# Patient Record
Sex: MALE | Race: WHITE | Employment: FULL TIME | URBAN - METROPOLITAN AREA
[De-identification: names, ages, dates, MRNs, and addresses within clinical notes are randomized per-mention and may not be internally consistent; named-entity substitution may affect disease eponyms.]

---

## 2017-04-20 ENCOUNTER — OFFICE VISIT (OUTPATIENT)
Dept: FAMILY MEDICINE | Facility: CLINIC | Age: 47
End: 2017-04-20
Payer: COMMERCIAL

## 2017-04-20 ENCOUNTER — TELEPHONE (OUTPATIENT)
Dept: FAMILY MEDICINE | Facility: CLINIC | Age: 47
End: 2017-04-20

## 2017-04-20 ENCOUNTER — RADIANT APPOINTMENT (OUTPATIENT)
Dept: GENERAL RADIOLOGY | Facility: CLINIC | Age: 47
End: 2017-04-20
Attending: FAMILY MEDICINE
Payer: COMMERCIAL

## 2017-04-20 VITALS
BODY MASS INDEX: 25.11 KG/M2 | OXYGEN SATURATION: 97 % | WEIGHT: 175 LBS | TEMPERATURE: 96.9 F | DIASTOLIC BLOOD PRESSURE: 70 MMHG | SYSTOLIC BLOOD PRESSURE: 106 MMHG | HEART RATE: 65 BPM

## 2017-04-20 DIAGNOSIS — F41.9 ANXIETY: ICD-10-CM

## 2017-04-20 DIAGNOSIS — R06.02 SOB (SHORTNESS OF BREATH): Primary | ICD-10-CM

## 2017-04-20 DIAGNOSIS — R06.02 SOB (SHORTNESS OF BREATH): ICD-10-CM

## 2017-04-20 PROCEDURE — 93000 ELECTROCARDIOGRAM COMPLETE: CPT | Performed by: FAMILY MEDICINE

## 2017-04-20 PROCEDURE — 71020 XR CHEST 2 VW: CPT

## 2017-04-20 PROCEDURE — 99214 OFFICE O/P EST MOD 30 MIN: CPT | Mod: 25 | Performed by: FAMILY MEDICINE

## 2017-04-20 RX ORDER — LORAZEPAM 0.5 MG/1
0.5 TABLET ORAL
Qty: 10 TABLET | Refills: 0 | Status: SHIPPED | OUTPATIENT
Start: 2017-04-20

## 2017-04-20 NOTE — TELEPHONE ENCOUNTER
Started Sunday evening with mild sob  States that it has progressively gotten worse  Speaking in full sentances  Lungs feel tight-   denies cough/wheezing/fever/st/fatigue  Has had difficulty sleeping- not sure if this is anxiety related or not  Not asthmatic- no ill contacts    Has appointment scheduled at 4:00 pm with Dr. Noe.    Елена Albright,RN  [facility]  [phone]

## 2017-04-20 NOTE — MR AVS SNAPSHOT
"              After Visit Summary   2017    Kory Barthelemy    MRN: [id number]           Patient Information     Date Of Birth          1970        Visit Information        Provider Department      2017 4:00 PM Garfield Noe MD [facility] [place]        Today's Diagnoses     SOB (shortness of breath)    -  1    Anxiety           Follow-ups after your visit        Who to contact     If you have questions or need follow up information about today's clinic visit or your schedule please contact [facility] [place] [place] directly at [phone].  Normal or non-critical lab and imaging results will be communicated to you by Caktushart, letter or phone within 4 business days after the clinic has received the results. If you do not hear from us within 7 days, please contact the clinic through Caktushart or phone. If you have a critical or abnormal lab result, we will notify you by phone as soon as possible.  Submit refill requests through FundersClub or call your pharmacy and they will forward the refill request to us. Please allow 3 business days for your refill to be completed.          Additional Information About Your Visit        MyChart Information     FundersClub lets you send messages to your doctor, view your test results, renew your prescriptions, schedule appointments and more. To sign up, go to [email or web address] . Click on \"Log in\" on the left side of the screen, which will take you to the Welcome page. Then click on \"Sign up Now\" on the right side of the page.     You will be asked to enter the access code listed below, as well as some personal information. Please follow the directions to create your username and password.     Your access code is: [email or web address]  Expires: 2017  4:56 PM     Your access code will  in 90 days. If you need help or a new code, please call your [facility] or [phone].        Care EveryWhere ID     This is your Care EveryWhere ID. This could be " used by other organizations to access your [facility] medical records  [id number]        Your Vitals Were     Pulse Temperature Pulse Oximetry BMI (Body Mass Index)          65 96.9  F (36.1  C) (Tympanic) 97% 25.11 kg/m2         Blood Pressure from Last 3 Encounters:   04/20/17 106/70   11/19/12 102/64   10/16/12 118/72    Weight from Last 3 Encounters:   04/20/17 175 lb (79.4 kg)   11/19/12 178 lb (80.7 kg)   10/16/12 183 lb 6.4 oz (83.2 kg)              We Performed the Following     EKG 12-lead complete w/read - Clinics          Today's Medication Changes          These changes are accurate as of: 4/20/17  4:56 PM.  If you have any questions, ask your nurse or doctor.               These medicines have changed or have updated prescriptions.        Dose/Directions    * LORazepam 1 MG tablet   Commonly known as:  ATIVAN   This may have changed:  Another medication with the same name was added. Make sure you understand how and when to take each.   Used for:  Unspecified acute reaction to stress   Changed by:  Jero Laughlin MD        1 TABLET ONE HOUR BEFORE AIR TRAVEL   Quantity:  5 tablet   Refills:  0       * LORazepam 0.5 MG tablet   Commonly known as:  ATIVAN   This may have changed:  You were already taking a medication with the same name, and this prescription was added. Make sure you understand how and when to take each.   Used for:  Anxiety   Changed by:  Garfield Noe MD        Dose:  0.5 mg   Take 1 tablet (0.5 mg) by mouth nightly as needed for anxiety   Quantity:  10 tablet   Refills:  0       * Notice:  This list has 2 medication(s) that are the same as other medications prescribed for you. Read the directions carefully, and ask your doctor or other care provider to review them with you.         Where to get your medicines      Some of these will need a paper prescription and others can be bought over the counter.  Ask your nurse if you have questions.     Bring a paper prescription for each of  these medications     LORazepam 0.5 MG tablet                Primary Care Provider    None Specified       No primary provider on file.        Thank you!     Thank you for choosing [facility] [place]  for your care. Our goal is always to provide you with excellent care. Hearing back from our patients is one way we can continue to improve our services. Please take a few minutes to complete the written survey that you may receive in the mail after your visit with us. Thank you!             Your Updated Medication List - Protect others around you: Learn how to safely use, store and throw away your medicines at www.disposemymeds.org.          This list is accurate as of: 4/20/17  4:56 PM.  Always use your most recent med list.                   Brand Name Dispense Instructions for use    * LORazepam 1 MG tablet    ATIVAN    5 tablet    1 TABLET ONE HOUR BEFORE AIR TRAVEL       * LORazepam 0.5 MG tablet    ATIVAN    10 tablet    Take 1 tablet (0.5 mg) by mouth nightly as needed for anxiety       * Notice:  This list has 2 medication(s) that are the same as other medications prescribed for you. Read the directions carefully, and ask your doctor or other care provider to review them with you.

## 2017-04-20 NOTE — NURSING NOTE
"Chief Complaint   Patient presents with     Breathing Problem       Initial /70  Pulse 65  Temp 96.9  F (36.1  C) (Tympanic)  Wt 175 lb (79.4 kg)  SpO2 97%  BMI 25.11 kg/m2 Estimated body mass index is 25.11 kg/(m^2) as calculated from the following:    Height as of 11/19/12: 5' 10\" (1.778 m).    Weight as of this encounter: 175 lb (79.4 kg).  Medication Reconciliation: complete    Current Outpatient Prescriptions   Medication Sig Dispense Refill     LORazepam (ATIVAN) 1 MG tablet 1 TABLET ONE HOUR BEFORE AIR TRAVEL 5 tablet 0       Jaime M, CMA  "

## 2017-04-20 NOTE — PROGRESS NOTES
SUBJECTIVE:                                                    Kory Barthelemy is a 46 year old male who presents to clinic today for the following health issues:    Concern - difficulty taking a deep breath     Onset: 5 days ago    Feels slight panic, no known cause. No chest pain, shortness of breath, no exertional symptoms.    Feels slight anxious when he thinks about it. No upper URI symptoms, no nausea or vomiting symptoms.    He has used ativan or similar medication in th past which has relaxed him, no depression, no other psych issues reported by the patient.    Description:   Having a hard time taking a deep breath - appears to be worse at night, pt unable to sleep due to it     Intensity: moderate    Progression of Symptoms:  same    Accompanying Signs & Symptoms:  Hard to breath                 Problem list and histories reviewed & adjusted, as indicated.  Additional history: as documented    Patient Active Problem List   Diagnosis     Neck pain     Eczema     Fungal infection of nail     CARDIOVASCULAR SCREENING; LDL GOAL LESS THAN 160     Anxiety     No past surgical history on file.    Social History   Substance Use Topics     Smoking status: Never Smoker     Smokeless tobacco: Never Used     Alcohol use 2.0 oz/week     Family History   Problem Relation Age of Onset     Breast Cancer Sister      Lipids Mother      Cardiovascular Father      pacemaker         Current Outpatient Prescriptions   Medication Sig Dispense Refill     LORazepam (ATIVAN) 0.5 MG tablet Take 1 tablet (0.5 mg) by mouth nightly as needed for anxiety 10 tablet 0     LORazepam (ATIVAN) 1 MG tablet 1 TABLET ONE HOUR BEFORE AIR TRAVEL 5 tablet 0       Reviewed and updated as needed this visit by clinical staff  Tobacco  Allergies  Meds  Soc Hx      Reviewed and updated as needed this visit by Provider         ROS:  C: NEGATIVE for fever, chills, change in weight  E/M: NEGATIVE for ear, mouth and throat problems  RESP:POSITIVE for  SOB/dyspnea  CV: NEGATIVE for chest pain, palpitations or peripheral edema    OBJECTIVE:                                                    /70  Pulse 65  Temp 96.9  F (36.1  C) (Tympanic)  Wt 175 lb (79.4 kg)  SpO2 97%  BMI 25.11 kg/m2  Body mass index is 25.11 kg/(m^2).   GENERAL: healthy, alert, well nourished, well hydrated, no distress  HENT: ear canals- normal; TMs- normal; Nose- normal; Mouth- no ulcers, no lesions  NECK: no tenderness, no adenopathy, no asymmetry, no masses, no stiffness; thyroid- normal to palpation  RESP: lungs clear to auscultation - no rales, no rhonchi, no wheezes  CV: regular rates and rhythm, normal S1 S2, no S3 or S4 and no murmur, no click or rub -  Mood is anxious, hard to relax.     ASSESSMENT/PLAN:                                                        ICD-10-CM    1. SOB (shortness of breath) R06.02 XR Chest 2 Views     EKG 12-lead complete w/read - Clinics   2. Anxiety F41.9 LORazepam (ATIVAN) 0.5 MG tablet     EKG reviewed, which is normal, CXR is normal, no acute pathology, patient has alta issue with close spaces and he noticed just thinking make him nervous.  We discuss anxiety and some techniques to see if it helps, he can try as needed ativan for anxiety and panic issues.  Follow up if not getting better.      Garfield Noe MD  [facility] [place]

## 2018-01-17 ENCOUNTER — OFFICE VISIT (OUTPATIENT)
Dept: FAMILY MEDICINE | Facility: CLINIC | Age: 48
End: 2018-01-17
Payer: COMMERCIAL

## 2018-01-17 VITALS
SYSTOLIC BLOOD PRESSURE: 110 MMHG | WEIGHT: 175 LBS | DIASTOLIC BLOOD PRESSURE: 70 MMHG | OXYGEN SATURATION: 98 % | HEART RATE: 68 BPM | BODY MASS INDEX: 25.11 KG/M2 | TEMPERATURE: 97.7 F

## 2018-01-17 DIAGNOSIS — J20.9 ACUTE BRONCHITIS WITH SYMPTOMS > 10 DAYS: Primary | ICD-10-CM

## 2018-01-17 DIAGNOSIS — R05.9 COUGH: ICD-10-CM

## 2018-01-17 PROCEDURE — 99213 OFFICE O/P EST LOW 20 MIN: CPT | Performed by: FAMILY MEDICINE

## 2018-01-17 RX ORDER — AZITHROMYCIN 250 MG/1
TABLET, FILM COATED ORAL
Qty: 6 TABLET | Refills: 0 | Status: SHIPPED | OUTPATIENT
Start: 2018-01-17 | End: 2018-05-07

## 2018-01-17 RX ORDER — ALBUTEROL SULFATE 90 UG/1
2 AEROSOL, METERED RESPIRATORY (INHALATION) EVERY 6 HOURS PRN
Qty: 1 INHALER | Refills: 0 | Status: SHIPPED | OUTPATIENT
Start: 2018-01-17 | End: 2018-05-07

## 2018-01-17 NOTE — MR AVS SNAPSHOT
"              After Visit Summary   2018    Kory Barthelemy    MRN: [id number]           Patient Information     Date Of Birth          1970        Visit Information        Provider Department      2018 6:40 PM Garfield Noe MD [facility] [place]        Today's Diagnoses     Acute bronchitis with symptoms > 10 days    -  1    Cough           Follow-ups after your visit        Who to contact     If you have questions or need follow up information about today's clinic visit or your schedule please contact [facility] [place] [place] directly at [phone].  Normal or non-critical lab and imaging results will be communicated to you by Acuity Systemshart, letter or phone within 4 business days after the clinic has received the results. If you do not hear from us within 7 days, please contact the clinic through Acuity Systemshart or phone. If you have a critical or abnormal lab result, we will notify you by phone as soon as possible.  Submit refill requests through Openplay or call your pharmacy and they will forward the refill request to us. Please allow 3 business days for your refill to be completed.          Additional Information About Your Visit        MyChart Information     Openplay lets you send messages to your doctor, view your test results, renew your prescriptions, schedule appointments and more. To sign up, go to [email or web address] . Click on \"Log in\" on the left side of the screen, which will take you to the Welcome page. Then click on \"Sign up Now\" on the right side of the page.     You will be asked to enter the access code listed below, as well as some personal information. Please follow the directions to create your username and password.     Your access code is: [email or web address]  Expires: 2018  6:53 PM     Your access code will  in 90 days. If you need help or a new code, please call your [facility] or [phone].        Care EveryWhere ID     This is your Care EveryWhere ID. " This could be used by other organizations to access your [facility] medical records  [id number]        Your Vitals Were     Pulse Temperature Pulse Oximetry BMI (Body Mass Index)          68 97.7  F (36.5  C) (Tympanic) 98% 25.11 kg/m2         Blood Pressure from Last 3 Encounters:   01/17/18 110/70   04/20/17 106/70   11/19/12 102/64    Weight from Last 3 Encounters:   01/17/18 175 lb (79.4 kg)   04/20/17 175 lb (79.4 kg)   11/19/12 178 lb (80.7 kg)              Today, you had the following     No orders found for display         Today's Medication Changes          These changes are accurate as of: 1/17/18  6:53 PM.  If you have any questions, ask your nurse or doctor.               Start taking these medicines.        Dose/Directions    albuterol 108 (90 BASE) MCG/ACT Inhaler   Commonly known as:  PROAIR HFA/PROVENTIL HFA/VENTOLIN HFA   Used for:  Acute bronchitis with symptoms > 10 days, Cough   Started by:  Garfield Noe MD        Dose:  2 puff   Inhale 2 puffs into the lungs every 6 hours as needed for shortness of breath / dyspnea or wheezing   Quantity:  1 Inhaler   Refills:  0       azithromycin 250 MG tablet   Commonly known as:  ZITHROMAX   Used for:  Acute bronchitis with symptoms > 10 days, Cough   Started by:  Garfield Noe MD        Two tablets first day, then one tablet daily for four days.   Quantity:  6 tablet   Refills:  0            Where to get your medicines      [facility] [address]     Phone:  [phone]     albuterol 108 (90 BASE) MCG/ACT Inhaler    azithromycin 250 MG tablet                Primary Care Provider    None Specified       No primary provider on file.        Equal Access to Services     JIM CROWELL AH: Gisela Whitten, wawesley lukirstin, qaybta kaalmada blaze, michele montero. So [facility]  [phone].    ATENCIÓN: Si idalia cunningham, tiene a gupta disposición servicios gratuitos de asistencia lingüística. Abdelrahman betts [phone].    We comply with applicable federal civil rights laws and [place] laws. We do not discriminate on the basis of race, color, national origin, age, disability, sex, sexual orientation, or gender identity.            Thank you!     Thank you for choosing [facility] [place]  for your care. Our goal is always to provide you with excellent care. Hearing back from our patients is one way we can continue to improve our services. Please take a few minutes to complete the written survey that you may receive in the mail after your visit with us. Thank you!             Your Updated Medication List - Protect others around you: Learn how to safely use, store and throw away your medicines at www.disposemymeds.org.          This list is accurate as of: 1/17/18  6:53 PM.  Always use your most recent med list.                   Brand Name Dispense Instructions for use Diagnosis    albuterol 108 (90 BASE) MCG/ACT Inhaler    PROAIR HFA/PROVENTIL HFA/VENTOLIN HFA    1 Inhaler    Inhale 2 puffs into the lungs every 6 hours as needed for shortness of breath / dyspnea or wheezing    Acute bronchitis with symptoms > 10 days, Cough       azithromycin 250 MG tablet    ZITHROMAX    6 tablet    Two tablets first day, then one tablet daily for four days.    Acute bronchitis with symptoms > 10 days, Cough       * LORazepam 1 MG tablet    ATIVAN    5 tablet    1 TABLET ONE HOUR BEFORE AIR TRAVEL    Unspecified acute reaction to stress       * LORazepam 0.5 MG tablet    ATIVAN    10 tablet    Take 1 tablet (0.5 mg) by mouth nightly as needed for anxiety    Anxiety       * Notice:  This list has 2 medication(s) that are the same as other medications prescribed for you. Read the directions carefully, and ask your doctor or other care provider to review them with you.

## 2018-01-18 NOTE — PROGRESS NOTES
SUBJECTIVE:   Kory Barthelemy is a 47 year old male who presents to clinic today for the following health issues:      Acute Illness   Acute illness concerns: cough and chest congesiton  Onset: 8- days fever is better but now more confection and dry cough.    Fever: no    Chills/Sweats: YES    Headache (location?): no    Sinus Pressure:no    Conjunctivitis:  no    Ear Pain: no    Rhinorrhea: no    Congestion: YES    Sore Throat: no     Cough: YES    Wheeze: no    Decreased Appetite: no    Nausea: no    Vomiting: no    Diarrhea:  no    Dysuria/Freq.: no    Fatigue/Achiness: YES    Sick/Strep Exposure: no     Therapies Tried and outcome: musinex             Problem list and histories reviewed & adjusted, as indicated.          Reviewed and updated as needed this visit by clinical staffTobacco  Allergies  Meds       Reviewed and updated as needed this visit by Provider         ROS:  C: NEGATIVE for fever, chills, change in weight  E/M: NEGATIVE for ear, mouth and throat problems  RESP:POSITIVE for cough-non productive  CV: NEGATIVE for chest pain, palpitations or peripheral edema    OBJECTIVE:                                                    /70  Pulse 68  Temp 97.7  F (36.5  C) (Tympanic)  Wt 175 lb (79.4 kg)  SpO2 98%  BMI 25.11 kg/m2  Body mass index is 25.11 kg/(m^2).   GENERAL: healthy, alert, well nourished, well hydrated, no distress  HENT: ear canals- normal; TMs- normal; Nose- normal; Mouth- no ulcers, no lesions  NECK: no tenderness, no adenopathy, no asymmetry, no masses, no stiffness; thyroid- normal to palpation  RESP: lungs clear to auscultation - no rales, no rhonchi, no wheezes  CV: regular rates and rhythm, normal S1 S2, no S3 or S4 and no murmur, no click or rub -       ASSESSMENT/PLAN:                                                        ICD-10-CM    1. Acute bronchitis with symptoms > 10 days J20.9 azithromycin (ZITHROMAX) 250 MG tablet     albuterol (PROAIR HFA/PROVENTIL  HFA/VENTOLIN HFA) 108 (90 BASE) MCG/ACT Inhaler   2. Cough R05 azithromycin (ZITHROMAX) 250 MG tablet     albuterol (PROAIR HFA/PROVENTIL HFA/VENTOLIN HFA) 108 (90 BASE) MCG/ACT Inhaler       Patient has symptoms initially has flulike symptoms which have resolved but now he has lingering cough with congestion.  I suggested to use albuterol as needed for symptomatic relief.  If symptoms are not better in the next 2-3 days or any fever then he can start taking antibiotics.      Garfield Noe MD  [facility]

## 2018-01-18 NOTE — NURSING NOTE
"Chief Complaint   Patient presents with     Cough       Initial /70  Pulse 68  Temp 97.7  F (36.5  C) (Tympanic)  Wt 175 lb (79.4 kg)  SpO2 98%  BMI 25.11 kg/m2 Estimated body mass index is 25.11 kg/(m^2) as calculated from the following:    Height as of 11/19/12: 5' 10\" (1.778 m).    Weight as of this encounter: 175 lb (79.4 kg).  Medication Reconciliation: complete    Current Outpatient Prescriptions   Medication Sig Dispense Refill     LORazepam (ATIVAN) 0.5 MG tablet Take 1 tablet (0.5 mg) by mouth nightly as needed for anxiety 10 tablet 0     LORazepam (ATIVAN) 1 MG tablet 1 TABLET ONE HOUR BEFORE AIR TRAVEL 5 tablet 0       Jaime M, [facility]  "

## 2018-05-07 ENCOUNTER — OFFICE VISIT (OUTPATIENT)
Dept: FAMILY MEDICINE | Facility: CLINIC | Age: 48
End: 2018-05-07
Payer: COMMERCIAL

## 2018-05-07 VITALS
BODY MASS INDEX: 24.34 KG/M2 | HEART RATE: 61 BPM | OXYGEN SATURATION: 98 % | TEMPERATURE: 97.2 F | SYSTOLIC BLOOD PRESSURE: 112 MMHG | DIASTOLIC BLOOD PRESSURE: 65 MMHG | WEIGHT: 170 LBS | HEIGHT: 70 IN

## 2018-05-07 DIAGNOSIS — Z23 NEED FOR TDAP VACCINATION: ICD-10-CM

## 2018-05-07 DIAGNOSIS — Z00.00 ROUTINE GENERAL MEDICAL EXAMINATION AT A HEALTH CARE FACILITY: Primary | ICD-10-CM

## 2018-05-07 DIAGNOSIS — D22.9 ATYPICAL MOLE: ICD-10-CM

## 2018-05-07 DIAGNOSIS — Z23 NEED FOR VACCINATION: ICD-10-CM

## 2018-05-07 DIAGNOSIS — Z13.6 CARDIOVASCULAR SCREENING; LDL GOAL LESS THAN 160: ICD-10-CM

## 2018-05-07 LAB
CHOLEST SERPL-MCNC: 149 MG/DL
GLUCOSE SERPL-MCNC: 90 MG/DL (ref 70–99)
HDLC SERPL-MCNC: 65 MG/DL
LDLC SERPL CALC-MCNC: 78 MG/DL
NONHDLC SERPL-MCNC: 84 MG/DL
TRIGL SERPL-MCNC: 32 MG/DL

## 2018-05-07 PROCEDURE — 36415 COLL VENOUS BLD VENIPUNCTURE: CPT | Performed by: FAMILY MEDICINE

## 2018-05-07 PROCEDURE — 80061 LIPID PANEL: CPT | Performed by: FAMILY MEDICINE

## 2018-05-07 PROCEDURE — 90471 IMMUNIZATION ADMIN: CPT | Performed by: FAMILY MEDICINE

## 2018-05-07 PROCEDURE — 82947 ASSAY GLUCOSE BLOOD QUANT: CPT | Performed by: FAMILY MEDICINE

## 2018-05-07 PROCEDURE — 90715 TDAP VACCINE 7 YRS/> IM: CPT | Performed by: FAMILY MEDICINE

## 2018-05-07 PROCEDURE — 99396 PREV VISIT EST AGE 40-64: CPT | Mod: 25 | Performed by: FAMILY MEDICINE

## 2018-05-07 NOTE — MR AVS SNAPSHOT
After Visit Summary   5/7/2018    Kory Barthelemy    MRN: [id number]           Patient Information     Date Of Birth          1970        Visit Information        Provider Department      5/7/2018 9:00 AM Gaby Glynn MD [facility]        Today's Diagnoses     Routine general medical examination at a health care facility    -  1    CARDIOVASCULAR SCREENING; LDL GOAL LESS THAN 160        Need for Tdap vaccination        Atypical mole          Care Instructions      Preventive Health Recommendations  Male Ages 40 to 49    Yearly exam:             See your health care provider every year in order to  o   Review health changes.   o   Discuss preventive care.    o   Review your medicines if your doctor has prescribed any.    You should be tested each year for STDs (sexually transmitted diseases) if you re at risk.     Have a cholesterol test every 5 years.     Have a colonoscopy (test for colon cancer) if someone in your family has had colon cancer or polyps before age 50.     After age 45, have a diabetes test (fasting glucose). If you are at risk for diabetes, you should have this test every 3 years.      Talk with your health care provider about whether or not a prostate cancer screening test (PSA) is right for you.    Shots: Get a flu shot each year. Get a tetanus shot every 10 years.     Nutrition:    Eat at least 5 servings of fruits and vegetables daily.     Eat whole-grain bread, whole-wheat pasta and brown rice instead of white grains and rice.     Talk to your provider about Calcium and Vitamin D.     Lifestyle    Exercise for at least 150 minutes a week (30 minutes a day, 5 days a week). This will help you control your weight and prevent disease.     Limit alcohol to one drink per day.     No smoking.     Wear sunscreen to prevent skin cancer.     See your dentist every six months for an exam and cleaning.              Follow-ups after your visit        Additional  Services     SKIN CARE REFERRAL       Your provider has referred you to: [facility] [place]: [facility] [place] [phone]  [email or web address]     Please be aware that coverage of these services is subject to the terms and limitations of your health insurance plan.  Please check with your insurance prior to the appointment to ensure appropriate coverage for any services considered cosmetic in nature or not medically necessary.    Please bring the following with you to your appointment:    (1) Any X-Rays, CTs or MRIs which have been performed.  Contact the facility where they were done to arrange for  prior to your scheduled appointment.  Any new CT, MRI or other procedures ordered by your specialist must be performed at a [facility] facility or coordinated by your clinic's referral office.  (2) List of current medications  (3) This referral request   (4) Any documents/labs given to you for this referral                  Follow-up notes from your care team     Return for Physical Exam.      Who to contact     If you have questions or need follow up information about today's clinic visit or your schedule please contact [facility] [place] directly at [phone].  Normal or non-critical lab and imaging results will be communicated to you by MyChart, letter or phone within 4 business days after the clinic has received the results. If you do not hear from us within 7 days, please contact the clinic through MyChart or phone. If you have a critical or abnormal lab result, we will notify you by phone as soon as possible.  Submit refill requests through [employer] or call your pharmacy and they will forward the refill request to us. Please allow 3 business days for your refill to be completed.          Additional Information About Your Visit        [employer] Information     [employer] lets you send messages to your doctor, view your test results, renew your  "prescriptions, schedule appointments and more. To sign up, go to [email or web address] . Click on \"Log in\" on the left side of the screen, which will take you to the Welcome page. Then click on \"Sign up Now\" on the right side of the page.     You will be asked to enter the access code listed below, as well as some personal information. Please follow the directions to create your username and password.     Your access code is: [email or web address]  Expires: 2018  9:35 AM     Your access code will  in 90 days. If you need help or a new code, please call your [facility] clinic or [phone].        Care EveryWhere ID     This is your Care EveryWhere ID. This could be used by other organizations to access your [facility] medical records  [id number]        Your Vitals Were     Pulse Temperature Height Pulse Oximetry BMI (Body Mass Index)       61 97.2  F (36.2  C) (Tympanic) 5' 10\" (1.778 m) 98% 24.39 kg/m2        Blood Pressure from Last 3 Encounters:   18 112/65   18 110/70   17 106/70    Weight from Last 3 Encounters:   18 170 lb (77.1 kg)   18 175 lb (79.4 kg)   17 175 lb (79.4 kg)              We Performed the Following     GLUCOSE     Lipid panel reflex to direct LDL Fasting     SKIN CARE REFERRAL        Primary Care Provider Fax #    Provider Not In System [phone]                Equal Access to Services     [place] [facility] : Hadii sri ku hadasho Soomaali, waaxda luqadaha, qaybta kaalmada adeegyada, waxay philip arguello . So [facility] [phone].    ATENCIÓN: Si habla español, tiene a gupta disposición servicios gratuitos de asistencia lingüística. Llame al [phone].    We comply with applicable federal civil rights laws and [place] laws. We do not discriminate on the basis of race, color, national origin, age, disability, sex, sexual orientation, or gender identity.            Thank you!     Thank you for choosing [facility] [place]  for your " care. Our goal is always to provide you with excellent care. Hearing back from our patients is one way we can continue to improve our services. Please take a few minutes to complete the written survey that you may receive in the mail after your visit with us. Thank you!             Your Updated Medication List - Protect others around you: Learn how to safely use, store and throw away your medicines at www.disposemymeds.org.          This list is accurate as of 5/7/18  9:35 AM.  Always use your most recent med list.                   Brand Name Dispense Instructions for use Diagnosis    LORazepam 0.5 MG tablet    ATIVAN    10 tablet    Take 1 tablet (0.5 mg) by mouth nightly as needed for anxiety    Anxiety

## 2018-05-07 NOTE — PROGRESS NOTES
SUBJECTIVE:   CC: Kory Barthelemy is an 47 year old male who presents for preventative health visit.     Healthy Habits:    Do you get at least three servings of calcium containing foods daily (dairy, green leafy vegetables, etc.)? yes    Amount of exercise or daily activities, outside of work: 4-5 day(s) per week    Problems taking medications regularly not applicable    Medication side effects: No    Have you had an eye exam in the past two years? no    Do you see a dentist twice per year? no    Do you have sleep apnea, excessive snoring or daytime drowsiness?no           Today's PHQ-2 Score:   PHQ-2 ( 1999 Pfizer) 5/7/2018 4/20/2017   Q1: Little interest or pleasure in doing things 0 0   Q2: Feeling down, depressed or hopeless 0 0   PHQ-2 Score 0 0       Abuse: Current or Past(Physical, Sexual or Emotional)- No  Do you feel safe in your environment - Yes    Social History   Substance Use Topics     Smoking status: Never Smoker     Smokeless tobacco: Never Used     Alcohol use 2.0 oz/week      If you drink alcohol do you typically have >3 drinks per day or >7 drinks per week? No                      Last PSA: No results found for: PSA    Reviewed orders with patient. Reviewed health maintenance and updated orders accordingly - Yes  Patient Active Problem List   Diagnosis     Neck pain     Eczema     Fungal infection of nail     CARDIOVASCULAR SCREENING; LDL GOAL LESS THAN 160     Anxiety     Past Surgical History:   Procedure Laterality Date     NO HISTORY OF SURGERY         Social History   Substance Use Topics     Smoking status: Never Smoker     Smokeless tobacco: Never Used     Alcohol use 2.0 oz/week     Family History   Problem Relation Age of Onset     Breast Cancer Sister      Lipids Mother      Colon Cancer No family hx of            Reviewed and updated as needed this visit by clinical staff         Reviewed and updated as needed this visit by Provider        Past Medical History:   Diagnosis Date      Unspecified hearing loss     Left        ROS:  C: NEGATIVE for fever, chills, change in weight  I: NEGATIVE for worrisome rashes, moles   E: NEGATIVE for vision changes or irritation  ENT: NEGATIVE for ear, mouth and throat problems  R: NEGATIVE for significant cough or SOB  CV: NEGATIVE for chest pain, palpitations or peripheral edema  GI: NEGATIVE for nausea, abdominal pain, heartburn, or change in bowel habits   male: negative for dysuria, hematuria, decreased urinary stream, erectile dysfunction, urethral discharge  M: NEGATIVE for significant arthralgias or myalgia  N: NEGATIVE for weakness, dizziness or paresthesias  E: NEGATIVE for temperature intolerance, skin/hair changes  H: NEGATIVE for bleeding problems  P: NEGATIVE for changes in mood or affect    OBJECTIVE:   There were no vitals taken for this visit.  EXAM:  GENERAL: healthy, alert and no distress  EYES: Eyes grossly normal to inspection, PERRL and conjunctivae and sclerae normal  HENT: ear canals and TM's normal, nose and mouth without ulcers or lesions  NECK: no adenopathy, no asymmetry, masses, or scars and thyroid normal to palpation  RESP: lungs clear to auscultation - no rales, rhonchi or wheezes  CV: regular rate and rhythm, normal S1 S2, no S3 or S4, no murmur, click or rub, no peripheral edema  ABDOMEN: soft, nontender, no hepatosplenomegaly, no masses and bowel sounds normal   (male): testicles normal without atrophy or masses, no hernias and penis normal without urethral discharge  RECTAL: deferred  MS: no gross musculoskeletal defects noted, no edema  SKIN: no suspicious  Rashes has lareg 6-7 mm dark black round mole rt . lateral side of lower  torso close below belt line   NEURO: Normal strength and tone, mentation intact and speech normal  PSYCH: mentation appears normal, affect normal/bright    ASSESSMENT/PLAN:     (Z00.00) Routine general medical examination at a health care facility  (primary encounter diagnosis)  Comment:   Plan:  "GLUCOSE, Lipid panel reflex to direct LDL         Fasting            (Z13.6) CARDIOVASCULAR SCREENING; LDL GOAL LESS THAN 160  Comment:   Plan: Lipid panel reflex to direct LDL Fasting            (Z23) Need for Tdap vaccination  Comment:   Plan:     Due for Tdap vaccination , booster given, since patient  was willing to proceed. Cares discussed. Follow up as needed       (D22.9) Atypical mole  Comment: rt abdominal wall   Plan: SKIN CARE REFERRAL       Per patient this looks darker and we have changed, sister also has some precancerous lesion removed.  He wish to proceed with a referral to further evaluate      COUNSELING:  Reviewed preventive health counseling, as reflected in patient instructions       Regular exercise       Healthy diet/nutrition       Vision screening       reports that he has never smoked. He has never used smokeless tobacco.    Estimated body mass index is 25.11 kg/(m^2) as calculated from the following:    Height as of 11/19/12: 5' 10\" (1.778 m).    Weight as of 1/17/18: 175 lb (79.4 kg).   Weight management plan: Discussed healthy diet and exercise guidelines and patient will follow up in 12 months in clinic to re-evaluate.    Counseling Resources:  ATP IV Guidelines  Pooled Cohorts Equation Calculator  FRAX Risk Assessment  ICSI Preventive Guidelines  Dietary Guidelines for Americans, 2010  USDA's MyPlate  ASA Prophylaxis  Lung CA Screening    Gaby Glynn MD  [place]  "

## 2018-05-08 ENCOUNTER — TELEPHONE (OUTPATIENT)
Dept: FAMILY MEDICINE | Facility: CLINIC | Age: 48
End: 2018-05-08

## 2018-05-08 NOTE — TELEPHONE ENCOUNTER
Patient referred to [facility] by Gaby Aruaz MD . Called patient, left message to call back if pt would like to schedule appointment with [facility].       Monica Lau MA on 5/8/2018 at 9:04 AM

## 2019-08-12 ENCOUNTER — OFFICE VISIT (OUTPATIENT)
Dept: FAMILY MEDICINE | Facility: CLINIC | Age: 49
End: 2019-08-12
Payer: COMMERCIAL

## 2019-08-12 VITALS
TEMPERATURE: 97 F | BODY MASS INDEX: 23.91 KG/M2 | RESPIRATION RATE: 14 BRPM | HEART RATE: 64 BPM | OXYGEN SATURATION: 99 % | DIASTOLIC BLOOD PRESSURE: 88 MMHG | HEIGHT: 70 IN | WEIGHT: 167 LBS | SYSTOLIC BLOOD PRESSURE: 124 MMHG

## 2019-08-12 DIAGNOSIS — H90.3 ASYMMETRICAL SENSORINEURAL HEARING LOSS: Primary | ICD-10-CM

## 2019-08-12 PROCEDURE — 99213 OFFICE O/P EST LOW 20 MIN: CPT | Performed by: PHYSICIAN ASSISTANT

## 2019-08-12 ASSESSMENT — ANXIETY QUESTIONNAIRES
2. NOT BEING ABLE TO STOP OR CONTROL WORRYING: NOT AT ALL
1. FEELING NERVOUS, ANXIOUS, OR ON EDGE: NOT AT ALL
7. FEELING AFRAID AS IF SOMETHING AWFUL MIGHT HAPPEN: NOT AT ALL
GAD7 TOTAL SCORE: 0
3. WORRYING TOO MUCH ABOUT DIFFERENT THINGS: NOT AT ALL
5. BEING SO RESTLESS THAT IT IS HARD TO SIT STILL: NOT AT ALL
6. BECOMING EASILY ANNOYED OR IRRITABLE: NOT AT ALL

## 2019-08-12 ASSESSMENT — PATIENT HEALTH QUESTIONNAIRE - PHQ9
5. POOR APPETITE OR OVEREATING: NOT AT ALL
SUM OF ALL RESPONSES TO PHQ QUESTIONS 1-9: 0

## 2019-08-12 ASSESSMENT — MIFFLIN-ST. JEOR: SCORE: 1643.76

## 2019-08-12 NOTE — PROGRESS NOTES
Subjective     Kory Barthelemy is a 48 year old male who presents to clinic today for the following health issues:    HPI   Trouble hearing out of left ear      Duration: 2 weeks ago    Description (location/character/radiation): feels as though equilibrium is off/feeling dizzy. No pain    Intensity:  severe    Accompanying signs and symptoms:     History (similar episodes/previous evaluation): None    Precipitating or alleviating factors: None    Therapies tried and outcome: None       Marshal has been experiencing 2 week hx of progressive hearing loss in his left ear.  He feels like the could in his left ear is muffled.  He is not experiencing a plugging sensation or any pain.  No tinnitus.  He has noticed some intermittent dizziness that feels like spinning which occurs at different times, but not specifically with movement.  No recent diving or travel.  No fevers or URI symptoms        Patient Active Problem List   Diagnosis     Neck pain     Eczema     Fungal infection of nail     CARDIOVASCULAR SCREENING; LDL GOAL LESS THAN 160     Anxiety     Past Surgical History:   Procedure Laterality Date     NO HISTORY OF SURGERY         Social History     Tobacco Use     Smoking status: Never Smoker     Smokeless tobacco: Never Used   Substance Use Topics     Alcohol use: Yes     Alcohol/week: 2.0 oz     Family History   Problem Relation Age of Onset     Breast Cancer Sister      Lipids Mother      Colon Cancer No family hx of          Current Outpatient Medications   Medication Sig Dispense Refill     LORazepam (ATIVAN) 0.5 MG tablet Take 1 tablet (0.5 mg) by mouth nightly as needed for anxiety 10 tablet 0     Allergies   Allergen Reactions     No Known Drug Allergies               Review of Systems   ROS COMP: Constitutional, HEENT, cardiovascular, pulmonary, gi and gu systems are negative, except as otherwise noted.      Objective    /88   Pulse 64   Temp 97  F (36.1  C) (Tympanic)   Resp 14   Ht 1.778 m (5'  "10\")   Wt 75.8 kg (167 lb)   SpO2 99%   BMI 23.96 kg/m    Body mass index is 23.96 kg/m .  Physical Exam   GENERAL: healthy, alert and no distress  HENT: ear canals and TM's normal, nose and mouth without ulcers or lesions  Neuro: FROM of UE bilaterally, left ear hearing absent to finger rubs, Jain test lateralizes to right ear.  Rinne test shows AC=BC, negative Priscila Hallpike  Diagnostic Test Results:  Labs reviewed in Epic        Assessment & Plan     1. Asymmetrical sensorineural hearing loss  Ears appear normal today without evidence of infection, perforation or fluid or cerumen impaction.   Unclear cause of hearing loss of left ear.  I have advised that he see ENT in the next few days for evaluation.  This appointment was scheduled for 8/14/2019.    - OTOLARYNGOLOGY REFERRAL        Follow up as needed    No follow-ups on file.    Scott Gaytan PA-C  [facility]      "

## 2019-08-12 NOTE — PATIENT INSTRUCTIONS
ENT Referral - [facility]  Appt on Wednesday, August 14 at 9:00 check in at 8:45 with Dr Moe  [address]  [place], [place]  [phone]   [email or web address]

## 2019-08-13 ASSESSMENT — ANXIETY QUESTIONNAIRES: GAD7 TOTAL SCORE: 0

## 2019-08-14 ENCOUNTER — TRANSFERRED RECORDS (OUTPATIENT)
Dept: HEALTH INFORMATION MANAGEMENT | Facility: CLINIC | Age: 49
End: 2019-08-14

## 2019-11-12 ENCOUNTER — OFFICE VISIT (OUTPATIENT)
Dept: OTOLARYNGOLOGY | Facility: CLINIC | Age: 49
End: 2019-11-12
Payer: COMMERCIAL

## 2019-11-12 VITALS — HEART RATE: 69 BPM | DIASTOLIC BLOOD PRESSURE: 77 MMHG | OXYGEN SATURATION: 99 % | SYSTOLIC BLOOD PRESSURE: 111 MMHG

## 2019-11-12 DIAGNOSIS — H90.3 SNHL (SENSORY-NEURAL HEARING LOSS), ASYMMETRICAL: Primary | ICD-10-CM

## 2019-11-12 PROCEDURE — 99202 OFFICE O/P NEW SF 15 MIN: CPT | Performed by: OTOLARYNGOLOGY

## 2019-11-12 ASSESSMENT — ENCOUNTER SYMPTOMS
BLURRED VISION: 0
HEMOPTYSIS: 0
NAUSEA: 0
DIZZINESS: 0
PHOTOPHOBIA: 0
STRIDOR: 0
HEARTBURN: 0
VOMITING: 0
SINUS PAIN: 0
CONSTITUTIONAL NEGATIVE: 1
DOUBLE VISION: 0
TINGLING: 0
COUGH: 0
TREMORS: 0
HEADACHES: 0
SPUTUM PRODUCTION: 0
BRUISES/BLEEDS EASILY: 0
SORE THROAT: 0

## 2019-11-12 NOTE — LETTER
11/12/2019         RE: Kory Barthelemy  [address]  [place] [address]        Dear Colleague,    Thank you for referring your patient, Kory Barthelemy, to the [facility]. Please see a copy of my visit note below.    HPI    This is a 49 year old patient who has been having hearing impairment in his left ear for years. Recently noticed worsening in his left ear hearing. Tinnitus is constant; mid range frequencies. Describes some motion related off-balance but no real spinning or vertigo. No autoimmune diseases, hypertension or diabetes.    Hearing test from ENT specialty care: There is asymmetry. Left hearing is worse. Bilateral sloping sensorineural hearing impairment. Tymps: WNLs. Recognition: 64 in the left; 100% in the right. Fistula test: negative. Huyen: Negative.  MRI of brain: within normal limits.    Review of Systems   Constitutional: Negative.    HENT: Positive for congestion, hearing loss and tinnitus. Negative for ear discharge, ear pain, nosebleeds, sinus pain and sore throat.    Eyes: Negative for blurred vision, double vision and photophobia.   Respiratory: Negative for cough, hemoptysis, sputum production and stridor.    Gastrointestinal: Negative for heartburn, nausea and vomiting.   Skin: Negative.    Neurological: Negative for dizziness, tingling, tremors and headaches.   Endo/Heme/Allergies: Negative for environmental allergies. Does not bruise/bleed easily.         Physical Exam  Vitals signs reviewed.   Constitutional:       Appearance: Normal appearance.   HENT:      Head: Normocephalic and atraumatic.      Jaw: There is normal jaw occlusion.      Right Ear: Tympanic membrane, ear canal and external ear normal. No decreased hearing noted. No laceration, drainage, swelling or tenderness.      Left Ear: Tympanic membrane, ear canal and external ear normal. Decreased hearing noted. No laceration, drainage, swelling or tenderness.      Nose: Septal deviation,  mucosal edema and congestion present.      Right Turbinates: Enlarged and swollen.      Left Turbinates: Enlarged and swollen.      Mouth/Throat:      Mouth: Mucous membranes are moist.      Pharynx: Oropharynx is clear. Uvula midline.   Eyes:      Extraocular Movements: Extraocular movements intact.      Pupils: Pupils are equal, round, and reactive to light.   Neurological:      Mental Status: He is alert.     Septum is deviated to the right; columellar dislocation to the left; inferior turbinates are enlarged.    A/P  This pleasant patient is having asymmetrical sensorineural hearing loss for months ( the first test was in 2005; likely for years). MRI: normal limits. There is asymmetry in his hearing. Left hearing is worse. Bilateral sloping sensorineural hearing impairment. Tymps: WNLs. Recognition: 64 in the left; 100% in the right. Fistula test: negative. Huyen: Negative. Options were discussed including topical Dexamethasone 24 mg. His questions were answered.        Again, thank you for allowing me to participate in the care of your patient.        Sincerely,        Geronimo Pedro MD

## 2019-11-12 NOTE — NURSING NOTE
Kory Barthelemy's goals for this visit include:   Chief Complaint   Patient presents with     Consult     2nd opinion, hearing loss, tinnitus in left ear periodically at night     He requests these members of his care team be copied on today's visit information: N/A    PCP: [facility] [place] [place]    Referring Provider:  No referring provider defined for this encounter.    /77 (BP Location: Right arm, Patient Position: Sitting, Cuff Size: Adult Large)   Pulse 69   SpO2 99%     Do you need any medication refills at today's visit? No    Donna Whittington LPN

## 2019-11-12 NOTE — PROGRESS NOTES
HPI    This is a 49 year old patient who has been having hearing impairment in his left ear for years. Recently noticed worsening in his left ear hearing. Tinnitus is constant; mid range frequencies. Describes some motion related off-balance but no real spinning or vertigo. No autoimmune diseases, hypertension or diabetes.    Hearing test from ENT specialty care: There is asymmetry. Left hearing is worse. Bilateral sloping sensorineural hearing impairment. Tymps: WNLs. Recognition: 64 in the left; 100% in the right. Fistula test: negative. Huyen: Negative.  MRI of brain: within normal limits.    Review of Systems   Constitutional: Negative.    HENT: Positive for congestion, hearing loss and tinnitus. Negative for ear discharge, ear pain, nosebleeds, sinus pain and sore throat.    Eyes: Negative for blurred vision, double vision and photophobia.   Respiratory: Negative for cough, hemoptysis, sputum production and stridor.    Gastrointestinal: Negative for heartburn, nausea and vomiting.   Skin: Negative.    Neurological: Negative for dizziness, tingling, tremors and headaches.   Endo/Heme/Allergies: Negative for environmental allergies. Does not bruise/bleed easily.         Physical Exam  Vitals signs reviewed.   Constitutional:       Appearance: Normal appearance.   HENT:      Head: Normocephalic and atraumatic.      Jaw: There is normal jaw occlusion.      Right Ear: Tympanic membrane, ear canal and external ear normal. No decreased hearing noted. No laceration, drainage, swelling or tenderness.      Left Ear: Tympanic membrane, ear canal and external ear normal. Decreased hearing noted. No laceration, drainage, swelling or tenderness.      Nose: Septal deviation, mucosal edema and congestion present.      Right Turbinates: Enlarged and swollen.      Left Turbinates: Enlarged and swollen.      Mouth/Throat:      Mouth: Mucous membranes are moist.      Pharynx: Oropharynx is clear. Uvula midline.   Eyes:       Extraocular Movements: Extraocular movements intact.      Pupils: Pupils are equal, round, and reactive to light.   Neurological:      Mental Status: He is alert.     Septum is deviated to the right; columellar dislocation to the left; inferior turbinates are enlarged.    A/P  This pleasant patient is having asymmetrical sensorineural hearing loss for months ( the first test was in 2005; likely for years). MRI: normal limits. There is asymmetry in his hearing. Left hearing is worse. Bilateral sloping sensorineural hearing impairment. Tymps: WNLs. Recognition: 64 in the left; 100% in the right. Fistula test: negative. Huyen: Negative. Options were discussed including topical Dexamethasone 24 mg. His questions were answered.